# Patient Record
Sex: FEMALE | Race: AMERICAN INDIAN OR ALASKA NATIVE
[De-identification: names, ages, dates, MRNs, and addresses within clinical notes are randomized per-mention and may not be internally consistent; named-entity substitution may affect disease eponyms.]

---

## 2019-12-01 ENCOUNTER — HOSPITAL ENCOUNTER (EMERGENCY)
Dept: HOSPITAL 5 - ED | Age: 54
Discharge: HOME | End: 2019-12-01
Payer: COMMERCIAL

## 2019-12-01 VITALS — SYSTOLIC BLOOD PRESSURE: 222 MMHG | DIASTOLIC BLOOD PRESSURE: 143 MMHG

## 2019-12-01 DIAGNOSIS — Z90.710: ICD-10-CM
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
## 2019-12-01 DIAGNOSIS — Z98.51: ICD-10-CM

## 2019-12-01 DIAGNOSIS — I11.0: ICD-10-CM

## 2019-12-01 DIAGNOSIS — I50.9: ICD-10-CM

## 2019-12-01 DIAGNOSIS — E11.9: ICD-10-CM

## 2019-12-01 DIAGNOSIS — Z79.899: ICD-10-CM

## 2019-12-01 DIAGNOSIS — Z88.8: ICD-10-CM
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
## 2019-12-01 DIAGNOSIS — S63.283A: Primary | ICD-10-CM
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
## 2019-12-01 DIAGNOSIS — Z90.49: ICD-10-CM

## 2019-12-01 NOTE — EVENT NOTE
Date: 12/01/19

















at the request of LAZARA Wang, gentle reduction was attempted using gentle 

distal traction on left middle finger. unable to reduce.








patient states its been dislocated for 2 weeks. she has intact distal neuro 

vascular integrity.





will have patient splinted, recommended PA discuss with orthopedics.


as finger has been dislocated for 2 weeks, it is reasonable to d/c with splint 

and out patient follow up








hypertension reviewed and appreciated


please reference the [facility] clinical policy on asymptomatic hypertension











                                   Vital Signs











  12/01/19





  11:02


 


Temperature 99.1 F


 


Pulse Rate 108 H


 


Respiratory 18





Rate 


 


Blood Pressure 222/143


 


O2 Sat by Pulse 97





Oximetry

## 2019-12-01 NOTE — EMERGENCY DEPARTMENT REPORT
ED Upper Extremity Inj HPI





- General


Chief Complaint: Extremity Injury, Upper


Stated Complaint: LFT HAND POSS DISLOCATED


Time Seen by Provider: 19 11:26


Source: patient


Mode of arrival: Ambulatory


Limitations: No Limitations





- History of Present Illness


Initial Comments: 





55 yo female injured slammed her left hand in door on 19. She was seen at 

ER in [place] and had reduction of her 3rd and 4th digit 2 weeks ago. She had on

going pain seen at Urgent care yesterday and was told her finger is still 

dislocated.  Left third digit is swollen and deformed.





Onset/Timin


-: week(s)


Other Extremity Injury: Fingers: Left (3rd pip)


Other Injuries: none


Improves With: none


Worsens With: none


Context: crush


Associated Symptoms: denies other symptoms





- Related Data


                                  Previous Rx's











 Medication  Instructions  Recorded  Last Taken  Type


 


HYDROcodone/APAP 7.5-325 [Norco 1 each PO Q6HR PRN #12 tablet 19 Unknown 

Rx





7.5/325]    











                                    Allergies











Allergy/AdvReac Type Severity Reaction Status Date / Time


 


azithromycin [From Zithromax] Allergy  Unknown Verified 19 11:30


 


insulin detemir Allergy  Unknown Verified 19 11:30





[From Levemir U-100 Insulin]     


 


ketorolac [From Toradol] Allergy  Unknown Verified 19 11:30














ED Review of Systems


ROS: 


Stated complaint: LFT HAND POSS DISLOCATED


Other details as noted in HPI





Comment: All other systems reviewed and negative





ED Past Medical Hx





- Past Medical History


Previous Medical History?: Yes


Hx Hypertension: Yes


Hx Congestive Heart Failure: Yes


Hx Diabetes: Yes


Hx Renal Disease: Yes


Additional medical history: gout, lupus, sgrogenes





- Surgical History


Past Surgical History?: Yes


Hx Cholecystectomy: Yes


Additional Surgical History: hysterectomy,tubiligation,left knee scopes, right 

knee scope, mass under chin, esphoageal





- Social History


Smoking Status: Never Smoker


Substance Use Type: None





- Medications


Home Medications: 


                                Home Medications











 Medication  Instructions  Recorded  Confirmed  Last Taken  Type


 


HYDROcodone/APAP 7.5-325 [Norco 1 each PO Q6HR PRN #12 tablet 19  Unknown 

Rx





7.5/325]     














ED Physical Exam





- General


Limitations: No Limitations


General appearance: alert, in no apparent distress





- Head


Head exam: Present: atraumatic, normocephalic





- Eye


Eye exam: Present: normal appearance





- Expanded Upper Extremity Exam


  ** Left


Shoulder Exam: Present: normal inspection


Upper Arm exam: Present: normal inspection


Elbow exam: Present: normal inspection


Forearm Wrist exam: Present: normal inspection


Hand Wrist exam: Present: tenderness, swelling, deformity (third PIP), erythema.

 Absent: full ROM


Vascular: Present: normal capillary refill





- Neurological Exam


Neurological exam: Present: alert, oriented X3





- Psychiatric


Psychiatric exam: Present: normal affect, normal mood





- Skin


Skin exam: Present: warm, dry, intact, normal color.  Absent: rash





ED Course


                                   Vital Signs











  19





  11:02


 


Temperature 99.1 F


 


Pulse Rate 108 H


 


Respiratory 18





Rate 


 


Blood Pressure 222/143


 


O2 Sat by Pulse 97





Oximetry 














ED Medical Decision Making





- Radiology Data


Radiology results: report reviewed





Patient: FRANCA BUCK MR#: [id number]





: 1965 Acct:[id number]





Age/Sex: 54 / F ADM Date: 19





Loc: ED


Attending Dr:








Ordering Physician: FUAD BARON


Date of Service: 19


Procedure(s): XR hand 3+V LT


Accession Number(s): [id number]





cc: FUAD BARON





Fluoro Time In Minutes:





Left hand-3 views





INDICATION: LEFT HAND INJURY.





COMPARISON: None.





IMPRESSION: Posterior dislocation of the middle phalanx of the long finger at 

the level of the PIP


joint with surrounding soft tissue swelling and no discrete fracture identified.

No significant


DJD.





Signer Name: Wilbert Mathew MD


Signed: 2019 12:18 PM


Workstation Name: [id number]








Transcribed By: MAUREEN


Dictated By: Wilbert Mathew MD


Electronically Authenticated By: Wilbert Mathew MD


Signed Date/Time: 19











DD/DT: 19


TD/TT:


Critical care attestation.: 


If time is entered above; I have spent that time in minutes in the direct care 

of this critically ill patient, excluding procedure time.








ED Disposition


Clinical Impression: 


 Dislocation, fingers





Disposition: - TO HOME OR SELFCARE


Is pt being admited?: No


Does the pt Need Aspirin: No


Condition: Stable


Instructions:  Finger Dislocation (ED)


Additional Instructions: 


Please keep splint on finger as much as possible.  Follow-up with Dr. Wilson 

orthopedic provider.  Take pain medication as needed.  Do not operate heavy 

machinery while taking Norco.


Prescriptions: 


HYDROcodone/APAP 7.5-325 [[place] 7.5/325] 1 each PO Q6HR PRN #12 tablet


 PRN Reason: Pain


Referrals: 


PRIMARY MD CHIP [Primary Care Provider] - 3-5 Days


FAY WILSON MD [Staff Physician] - 3-5 Days


Forms:  Work/School Release Form(ED)

## 2019-12-01 NOTE — XRAY REPORT
Left hand-3 views



INDICATION:  LEFT  HAND INJURY.



COMPARISON: None.



IMPRESSION:  Posterior dislocation of the middle phalanx of the long finger at the level of the PIP j
oint with surrounding soft tissue swelling and no discrete fracture identified.  No significant DJD.



Signer Name: Wilbert Mathew MD 

Signed: 12/1/2019 12:18 PM

 Workstation Name: [id number]

## 2019-12-01 NOTE — EMERGENCY DEPARTMENT REPORT
Chief Complaint: Extremity Injury, Upper


Stated Complaint: LFT HAND POSS DISLOCATED


Time Seen by Provider: 12/01/19 11:26





- Exam


Vital Signs: 


                                   Vital Signs











  12/01/19





  11:02


 


Temperature 99.1 F


 


Pulse Rate 108 H


 


Respiratory 18





Rate 


 


Blood Pressure 222/143


 


O2 Sat by Pulse 97





Oximetry 











MSE screening note: 


Focused history and physical exam performed.


Due to findings the following was ordered:





53 yo female injured slammed her left hand in door on 11/12/19. She was seen at 

ER in [place] and had reduction of her 3rd and 4th digit. She had on going pain 

seen at Urgent care yesterday and was told her finger is still dislocated.  Left

third digit is swollen and deformed. Xray of the left hand ordered. 





ED Disposition for [facility]


Condition: Stable